# Patient Record
Sex: MALE | Race: WHITE | NOT HISPANIC OR LATINO | Employment: FULL TIME | URBAN - METROPOLITAN AREA
[De-identification: names, ages, dates, MRNs, and addresses within clinical notes are randomized per-mention and may not be internally consistent; named-entity substitution may affect disease eponyms.]

---

## 2017-09-18 ENCOUNTER — TRANSCRIBE ORDERS (OUTPATIENT)
Dept: ADMINISTRATIVE | Facility: HOSPITAL | Age: 52
End: 2017-09-18

## 2017-09-18 ENCOUNTER — HOSPITAL ENCOUNTER (OUTPATIENT)
Dept: GENERAL RADIOLOGY | Facility: HOSPITAL | Age: 52
Discharge: HOME | End: 2017-09-18
Admitting: FAMILY MEDICINE

## 2017-09-18 DIAGNOSIS — R52 PAIN: ICD-10-CM

## 2017-09-18 DIAGNOSIS — R52 PAIN: Primary | ICD-10-CM

## 2017-09-18 PROCEDURE — 72050 X-RAY EXAM NECK SPINE 4/5VWS: CPT

## 2023-12-07 ENCOUNTER — TREATMENT (OUTPATIENT)
Dept: PHYSICAL THERAPY | Facility: CLINIC | Age: 58
End: 2023-12-07
Payer: OTHER MISCELLANEOUS

## 2023-12-07 ENCOUNTER — TRANSCRIBE ORDERS (OUTPATIENT)
Dept: OCCUPATIONAL THERAPY | Facility: CLINIC | Age: 58
End: 2023-12-07
Payer: COMMERCIAL

## 2023-12-07 DIAGNOSIS — S83.401D SPRAIN OF COLLATERAL LIGAMENT OF RIGHT KNEE, SUBSEQUENT ENCOUNTER: Primary | ICD-10-CM

## 2023-12-07 DIAGNOSIS — M25.561 ACUTE PAIN OF RIGHT KNEE: Primary | ICD-10-CM

## 2023-12-07 DIAGNOSIS — S86.912D KNEE STRAIN, LEFT, SUBSEQUENT ENCOUNTER: ICD-10-CM

## 2023-12-07 NOTE — PROGRESS NOTES
Physical Therapy Initial Evaluation and Plan of Care    [id number] [place] [address]  [phone]  Patient: Tirso Llanos   : 1965  Diagnosis/ICD-10 Code:  Acute pain of right knee [M25.561]  Referring practitioner: Enrrique Carlos MD  Date of Initial Visit: 2023  Today's Date: 2023  Patient seen for 1 session         Visit Diagnoses:    ICD-10-CM ICD-9-CM   1. Acute pain of right knee  M25.561 719.46   2. 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 Finished route and swollen by then. On dose pack - helping. X-ray showed arthritis and spurs. No giving way since injury but does catch some. Swelling has gone down. No prior knee injury. 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, stoop, squat, climbPain  Current pain ratin  At worst pain ratin  Location: medial R knee  Quality: sharp  Relieving factors: rest and medications  Aggravating factors: sleeping (turn wrong way)  Progression: improved    Diagnostic Tests  X-ray: abnormal    Treatments  Previous treatment: medication (neoprene sleeve)  Current treatment: physical therapy  Patient Goals  Patient goals for therapy: decreased pain and return to work             Objective          Observations     Additional Knee Observation Details  Neoprene sleeve    Tenderness     Right Knee   Tenderness in the inferior patella and MCL (proximal). No tenderness in the medial joint line.     Active Range of Motion     Right Knee   Flexion: 125 degrees   Extension: 0 degrees     Strength/Myotome Testing     Left Hip   Planes of Motion   Abduction: 5  Adduction: 5    Right Hip   Planes of Motion   Abduction: 5 (pain medial knee)  Adduction: 5    Left Knee    Flexion: 5  Extension: 5    Right Knee   Flexion: 5  Extension: 5    Tests     Right Knee   Positive medial Mai.   Negative anterior drawer and posterior drawer.     Additional Tests Details  Pain with valgus stress but no appreciable laxity    Ambulation     Ambulation: Level Surfaces   Ambulation without assistive device: independent    Additional Level Surfaces Ambulation Details  Mild antalgia RLE with dec stance time          Assessment & Plan       Assessment  Impairments: abnormal gait, abnormal or restricted ROM and pain with function   Functional limitations: sleeping, walking and stooping   Assessment details: Tirso Llanos is 58 y.o. male who presents today to Physical Therapy for acute onset pain medial R knee from valgus stress getting out of truck.  Signs/sxs of strain with possible MCL and medial meniscus involvement but not obvious. Patient would benefit from skilled physical therapy to address functional limitations and impairments to improve quality of life and RTW regular duty.  Prognosis: good    Goals  Plan Goals: STGs x 1 wk  1. Increasing ROM flexion to 130 for improved ADLs  2. Pain with WB ADLs < 6/10  3. Review body mechanics and joint protection principles with ADLs  4. Ambulates level surface and 6 inch steps with min to no antalgia    LTGs x 4 wks  1. ROM and strength WFL and = B for normal ADLs  2. Negative special testing for derangement  3. Ambulates 5 min and 8 in stairs with normal gait  4. Demonstrates tolerance for push/pull sled with added 100 lbs and squat to lift 50 lbs to allow return to work w/o restriction    Plan  Therapy options: will be seen for skilled therapy services  Planned modality interventions: ultrasound and electrical stimulation/Russian stimulation  Planned therapy interventions: therapeutic activities, stretching, strengthening, soft tissue mobilization, balance/weight-bearing training, home exercise program and neuromuscular re-education  Frequency:  3x week  Duration in weeks: 3  Treatment plan discussed with: patient        History # of Personal Factors and/or Comorbidities: LOW (0)  Examination of Body System(s): # of elements: LOW (1-2)  Clinical Presentation: STABLE   Clinical Decision Making: LOW       Timed:         Manual Therapy:    0     mins  72806;     Therapeutic Exercise:    10     mins  86307;     Neuromuscular Ben:    0    mins  67246;    Therapeutic Activity:     10     mins  46533;     Gait Trainin     mins  38913;     Ultrasound:     8     mins  87674;    Ionto                               0    mins   81869  Self Care                       0     mins   45261      Un-Timed:  Electrical Stimulation:    0     mins  89026 ( );  Dry Needling     0     mins self-pay  Traction     0     mins 80008  Low Eval     18    Mins  95918  Mod Eval     0     Mins  10908  High Eval                       0     Mins  53999  Canalith Repos    0     mins 78699      Timed Treatment:   28   mins   Total Treatment:     46   mins          PT: Tess Mejia PT     License Number: [id number]  Electronically signed by Tess Mejia PT, 23, 4:17 PM EST    Certification Period: 2023 thru 3/5/2024  I certify that the therapy services are furnished while this patient is under my care.  The services outlined above are required by this patient, and will be reviewed every 90 days.         Physician Signature:__________________________________________________    PHYSICIAN: Enrrique Carlos MD  NPI: [id number]                                      DATE:      Please sign and return via fax to [email or web address] . Thank you, [facility].

## 2023-12-08 ENCOUNTER — TREATMENT (OUTPATIENT)
Dept: PHYSICAL THERAPY | Facility: CLINIC | Age: 58
End: 2023-12-08
Payer: OTHER MISCELLANEOUS

## 2023-12-08 DIAGNOSIS — S86.912D KNEE STRAIN, LEFT, SUBSEQUENT ENCOUNTER: ICD-10-CM

## 2023-12-08 DIAGNOSIS — M25.561 ACUTE PAIN OF RIGHT KNEE: Primary | ICD-10-CM

## 2023-12-08 PROCEDURE — 97110 THERAPEUTIC EXERCISES: CPT | Performed by: PHYSICAL THERAPIST

## 2023-12-08 PROCEDURE — 97035 APP MDLTY 1+ULTRASOUND EA 15: CPT | Performed by: PHYSICAL THERAPIST

## 2023-12-08 PROCEDURE — 97530 THERAPEUTIC ACTIVITIES: CPT | Performed by: PHYSICAL THERAPIST

## 2023-12-08 NOTE — PROGRESS NOTES
Physical Therapy Daily Treatment Note    Visit Diagnoses:    ICD-10-CM ICD-9-CM   1. Acute pain of right knee  M25.561 719.46   2. Knee strain, left, subsequent encounter  S86.912D V58.89     844.9       VISIT#: 2      Tirso Llanos reports: His knee is doing better today. It hurts when he is driving. He has been sleeping better and his swelling is down. He is favoring his LLE when he walks. His pain has been moving around.   Current Pain Level:    0/10; Worst:   4-5/10 when driving over here; Best:  0/10  Affected Area:  medial R knee   Quality of Pain: sharp   Functional Deficits/Irritating Factors: sleeping, walking and stooping    Progression: improving  Compliance with HEP Reported: Yes    Objective  Presents: antalgic gait, mild edema today  Added to Program: SL hip abduction, Prone Hip extension, LSL Clam, Heel/Toe raises    Tenderness: very tender today at medial joint line. Jumped with palpitation  See Exercise, Manual, and Modality Logs for complete treatment.     Patient Education: Pt was educated on exercise biomechanical correctness, intensity, and speed.     Assessment:  Tirso is doing better today. His swelling has decreased and so has his pain. His sleeping has improved but still pain with driving. He did jump with palpitation at the medial joint line and near patella tendon insertion. He also reported some pain during sitting knee flexion if he bent too far. Overall, he is improving. Pt will continue to benefit from skilled PT interventions to address current functional deficits and impairments.       Plan: Progress to/Continue with current program.       Timed:  Manual Therapy:            0_    mins  62075;  Ultrasound:                     10      mins  33181;   Therapeutic Exercise:    20     mins  72496;     Neuromuscular Ben:   0     mins  91362;    Therapeutic Activity:      10     mins  34198;      Iontophoresis              _0__   mins  Dry Needling               _0____   mins          Untimed:  Work Conditioning: __0__ mins 56713  Electrical Stimulation:    0    mins  14928 ( );  Mechanical Traction:       0        mins  58369;   Paraffin                       __0___  mins   Ice/Heat: __0__ mins      Timed Treatment:   40   mins   Total Treatment:     40   mins          KILEY Choi License # [id number]  Physical Therapist Assistant

## 2023-12-11 ENCOUNTER — TREATMENT (OUTPATIENT)
Dept: PHYSICAL THERAPY | Facility: CLINIC | Age: 58
End: 2023-12-11
Payer: OTHER MISCELLANEOUS

## 2023-12-11 DIAGNOSIS — M25.561 ACUTE PAIN OF RIGHT KNEE: Primary | ICD-10-CM

## 2023-12-11 DIAGNOSIS — S86.912D KNEE STRAIN, LEFT, SUBSEQUENT ENCOUNTER: ICD-10-CM

## 2023-12-11 NOTE — PROGRESS NOTES
Physical Therapy MD Note/Progreess Note          [address] [facility]   [place] [address]  [phone]  12/11/2023  Enrrique Carlos MD    Re: Tirso Llanos  1965  Enrrique Carlos Md  [address] [address] [facility]   [facility] [address]  [place] [address]   ________________________________________________________________    Mr. Tirso Llanos, has attended 3 PT sessions.  Treatment has consisted of: US, there-ex, pt education, HEP     S: Mr. Tirso Llanos states: way better.  Still aggravates a little with driving but not bad. Still catches and pops every once in a while. No locking or giving way. On last day of steroid and took 800 mg IBP this morning.  Took next week off work to give more time to heel.  Also been using Frankensence on my knee.      Objective          Tenderness     Right Knee   No tenderness in the inferior patella, MCL (distal), MCL (proximal) and medial joint line.     Active Range of Motion     Right Knee   Flexion: 130 (slightly less than Left knee) degrees   Extension: 0 degrees     Strength/Myotome Testing     Left Hip   Planes of Motion   Abduction: 5  Adduction: 5    Right Hip   Planes of Motion   Abduction: 5 (pain medial knee)  Adduction: 5    Left Knee   Flexion: 5  Extension: 5    Right Knee   Flexion: 5  Extension: 5    Tests     Right Knee   Negative anterior drawer, posterior drawer, valgus stress test at 0 degrees and varus stress test at 0 degrees.     Additional Tests Details  Pain and guarding near end of medial Mai test    Ambulation     Ambulation: Level Surfaces   Ambulation without assistive device: independent    Additional Level Surfaces Ambulation Details  No notable antalgia RLE       See Exercise, Manual, and Modality Logs for complete treatment.       Assessment/Plan  Improved overall subjectively and objectively but still with signs/sxs possible but not obvious MMT. Pain and reported feeling a little shaky ambulating back down the 14 inch step and with  pulling the 100# weighted sled    Recommend another week of PT for further healing    Awaiting MD orders             Manual Therapy:    0     mins  64723;  Therapeutic Exercise:    16     mins  37089;     Neuromuscular Ben:    0    mins  60469;    Therapeutic Activity:     20     mins  67760;     Gait Trainin     mins  60214;     Ultrasound:     8     mins  57837;    Work Hardening           0      mins 47118  E-stim                0   mins 23150    Timed Treatment:   44   mins   Total Treatment:     44   mins    Tess Mejia, PT  Physical Therapist  [place] #[id number]

## 2023-12-14 ENCOUNTER — TELEPHONE (OUTPATIENT)
Dept: PHYSICAL THERAPY | Facility: CLINIC | Age: 58
End: 2023-12-14

## 2023-12-14 NOTE — TELEPHONE ENCOUNTER
Caller: Tirso Llanos    Relationship: Self    What was the call regarding: PATIENT WILL NOT BE AT TODAY'S APPT DUE TO HAVING A LOW GRADE FEVER.

## 2023-12-15 ENCOUNTER — TREATMENT (OUTPATIENT)
Dept: PHYSICAL THERAPY | Facility: CLINIC | Age: 58
End: 2023-12-15
Payer: OTHER MISCELLANEOUS

## 2023-12-15 DIAGNOSIS — M25.561 ACUTE PAIN OF RIGHT KNEE: Primary | ICD-10-CM

## 2023-12-15 NOTE — PROGRESS NOTES
"Physical Therapy Daily Treatment Note    Visit Diagnoses:    ICD-10-CM ICD-9-CM   1. Acute pain of right knee  M25.561 719.46       VISIT#: 4      Tirso Llanos reports: His R knee is not having any pain today. Yesterday, he luz overdid it on his exercises so today his buttocks and inner thighs are pretty sore.   Current Pain Level:    0/10; Worst:   4/10 when driving over here; Best:  0/10  Affected Area:  medial R knee   Quality of Pain: sharp   Functional Deficits/Irritating Factors:  stooping, driving    Progression: improving  Compliance with HEP Reported: Yes    Objective  Presents:Face masked donned since yesterday he had a fever.   Increased sets/reps of:  hip adduction, sled push/pull   Increased resistance on:  SLR up on elbows, LSL clams  Added to Program: squats to lift 50#        See Exercise, Manual, and Modality Logs for complete treatment.     Patient Education: Pt was educated on exercise biomechanical correctness, intensity, and speed.     Assessment:  Tirso's knee is progressing very well. He did very well with his exercises today. He did have a slight twinge in his medial knee during 14\" step up. Otherwise, no increase in signs/symptoms reported or observed. If he continues to do well over the weekend, he should be ready for d/c to his HEP. Pt will continue to benefit from skilled PT interventions to address current functional deficits and impairments.       Plan: Progress to/Continue with current program. To MD on Monday      Timed:  Manual Therapy:            0_    mins  08248;  Ultrasound:                     10      mins  75315;   Therapeutic Exercise:    15     mins  34269;     Neuromuscular Ben:   0     mins  94337;    Therapeutic Activity:      10     mins  11561;      Iontophoresis              _0__   mins  Dry Needling               _0____   mins         Untimed:  Work Conditioning: __0__ mins 33438  Electrical Stimulation:    0    mins  83368 ( );  Mechanical Traction:       " 0        mins  15058;   Paraffin                       __0___  mins   Ice/Heat: __0__ mins      Timed Treatment:   35   mins   Total Treatment:     35   mins          Olamide Rose PTA  [place] License # [id number]  Physical Therapist Assistant

## 2023-12-17 NOTE — PROGRESS NOTES
MD Letter  Patient: Tirso Llanos   : 1965    Date of Initial Visit: Type: THERAPY  Noted: 2023  Visit Diagnoses:     ICD-10-CM ICD-9-CM   1. Acute pain of right knee  M25.561 719.46   2. Knee strain, left, subsequent encounter  S86.912D V58.89     844.9     Today's Date: 2023  Patient seen for 5 sessions    Treatment has included: therapeutic exercise, neuromuscular re-education, therapeutic activity, and ultrasound    Subjective Pt reports a 95% improvement since starting PT. He had some pain of 2/10 last night that woke him up. Other than that his knee had been doing well. He does desire to get stronger. Going up a ladder and steps, his knee feels weak.   Current Pain Level:    0/10; Worst:   2/10 steps, ladder  Affected Area:  medial R knee   Quality of Pain: sharp   Functional Deficits/Irritating Factors:  stooping, driving     Objective   AROM R knee Flexion in Supine = deg; Extension = deg  MMT R knee flexion = /5; Extension = /5     Palpation :   Activity tolerance : Able to squat and lift 50# x 3 reps; Able to push/pull 100# sled x 100'    @PTASSESSMENT/PLAN@  Patient has demonstrated good improvement since the initiation of therapy.  The pain has diminished.  The motion has improved.  The activity tolerances have increased.  He does feel some weakness still in the knee and might benefit from another week of strengthening but he desires to return to work.Also he still has some mild pain with medial stress on the knee as in SL hip adduction. I feel that the patient would benefit from discharge to his [facility] and local gym to continue strengthening.  If you have any questions concerning the care, please do not hesitate to contact me.              PT Signature: Olamide Rose PTA        Electrical Stimulation: __0____ mins 43312/  Ultrasound:   0 mins 65207  Work Conditionin mins 58136  Iontophoresis: 0 mins 82340  Traction: 0 mins 78776  Manual Therapy:    0     mins   57239;  Therapeutic Exercise:    25     mins  34891;     Neuromuscular Ben:    10    mins  26887;    Therapeutic Activity:     10     mins  73979;     Ice/Heat:  __0_ mins   Timed Treatment:   45   mins   Total Treatment:     45   mins

## 2023-12-18 ENCOUNTER — TREATMENT (OUTPATIENT)
Dept: PHYSICAL THERAPY | Facility: CLINIC | Age: 58
End: 2023-12-18
Payer: OTHER MISCELLANEOUS

## 2023-12-18 DIAGNOSIS — S86.912D KNEE STRAIN, LEFT, SUBSEQUENT ENCOUNTER: ICD-10-CM

## 2023-12-18 DIAGNOSIS — M25.561 ACUTE PAIN OF RIGHT KNEE: Primary | ICD-10-CM
